# Patient Record
Sex: FEMALE | Race: WHITE | HISPANIC OR LATINO | Employment: FULL TIME | URBAN - METROPOLITAN AREA
[De-identification: names, ages, dates, MRNs, and addresses within clinical notes are randomized per-mention and may not be internally consistent; named-entity substitution may affect disease eponyms.]

---

## 2017-03-08 ENCOUNTER — OFFICE VISIT (OUTPATIENT)
Dept: FAMILY MEDICINE CLINIC | Facility: CLINIC | Age: 27
End: 2017-03-08

## 2017-03-08 VITALS
WEIGHT: 212 LBS | DIASTOLIC BLOOD PRESSURE: 68 MMHG | BODY MASS INDEX: 40.02 KG/M2 | HEIGHT: 61 IN | OXYGEN SATURATION: 96 % | SYSTOLIC BLOOD PRESSURE: 110 MMHG | TEMPERATURE: 97.9 F | HEART RATE: 69 BPM

## 2017-03-08 DIAGNOSIS — E66.01 MORBID OBESITY DUE TO EXCESS CALORIES (HCC): ICD-10-CM

## 2017-03-08 DIAGNOSIS — F17.200 TOBACCO DEPENDENCE: ICD-10-CM

## 2017-03-08 DIAGNOSIS — L72.3 SEBACEOUS CYST OF LEFT AXILLA: ICD-10-CM

## 2017-03-08 DIAGNOSIS — Z00.00 HEALTHCARE MAINTENANCE: Primary | ICD-10-CM

## 2017-03-08 LAB
25(OH)D3 SERPL-MCNC: 8.1 NG/ML
ALBUMIN SERPL-MCNC: 4.4 G/DL (ref 3.2–4.8)
ALBUMIN/GLOB SERPL: 1.4 G/DL (ref 1.5–2.5)
ALP SERPL-CCNC: 112 U/L (ref 25–100)
ALT SERPL W P-5'-P-CCNC: 31 U/L (ref 7–40)
ANION GAP SERPL CALCULATED.3IONS-SCNC: 6 MMOL/L (ref 3–11)
ARTICHOKE IGE QN: 110 MG/DL (ref 0–130)
AST SERPL-CCNC: 24 U/L (ref 0–33)
BASOPHILS # BLD AUTO: 0.01 10*3/MM3 (ref 0–0.2)
BASOPHILS NFR BLD AUTO: 0.1 % (ref 0–1)
BILIRUB BLD-MCNC: NEGATIVE MG/DL
BILIRUB SERPL-MCNC: 0.6 MG/DL (ref 0.3–1.2)
BUN BLD-MCNC: 14 MG/DL (ref 9–23)
BUN/CREAT SERPL: 17.5 (ref 7–25)
CALCIUM SPEC-SCNC: 9.6 MG/DL (ref 8.7–10.4)
CHLORIDE SERPL-SCNC: 103 MMOL/L (ref 99–109)
CHOLEST SERPL-MCNC: 187 MG/DL (ref 0–200)
CLARITY, POC: CLEAR
CO2 SERPL-SCNC: 27 MMOL/L (ref 20–31)
COLOR UR: YELLOW
CREAT BLD-MCNC: 0.8 MG/DL (ref 0.6–1.3)
CRP SERPL-MCNC: 9.1 MG/DL (ref 0–10)
DEPRECATED RDW RBC AUTO: 43.9 FL (ref 37–54)
EOSINOPHIL # BLD AUTO: 0.05 10*3/MM3 (ref 0.1–0.3)
EOSINOPHIL NFR BLD AUTO: 0.7 % (ref 0–3)
ERYTHROCYTE [DISTWIDTH] IN BLOOD BY AUTOMATED COUNT: 13.1 % (ref 11.3–14.5)
GFR SERPL CREATININE-BSD FRML MDRD: 87 ML/MIN/1.73
GLOBULIN UR ELPH-MCNC: 3.2 GM/DL
GLUCOSE BLD-MCNC: 87 MG/DL (ref 70–100)
GLUCOSE UR STRIP-MCNC: NEGATIVE MG/DL
HBA1C MFR BLD: 5.1 % (ref 4.8–5.6)
HCT VFR BLD AUTO: 45.3 % (ref 34.5–44)
HDLC SERPL-MCNC: 69 MG/DL (ref 40–60)
HGB BLD-MCNC: 15.2 G/DL (ref 11.5–15.5)
HIV1+2 AB SER QL: NORMAL
IMM GRANULOCYTES # BLD: 0.01 10*3/MM3 (ref 0–0.03)
IMM GRANULOCYTES NFR BLD: 0.1 % (ref 0–0.6)
KETONES UR QL: NEGATIVE
LEUKOCYTE EST, POC: NEGATIVE
LYMPHOCYTES # BLD AUTO: 2.24 10*3/MM3 (ref 0.6–4.8)
LYMPHOCYTES NFR BLD AUTO: 32.6 % (ref 24–44)
MCH RBC QN AUTO: 30.8 PG (ref 27–31)
MCHC RBC AUTO-ENTMCNC: 33.6 G/DL (ref 32–36)
MCV RBC AUTO: 91.7 FL (ref 80–99)
MONOCYTES # BLD AUTO: 0.33 10*3/MM3 (ref 0–1)
MONOCYTES NFR BLD AUTO: 4.8 % (ref 0–12)
NEUTROPHILS # BLD AUTO: 4.24 10*3/MM3 (ref 1.5–8.3)
NEUTROPHILS NFR BLD AUTO: 61.7 % (ref 41–71)
NITRITE UR-MCNC: NEGATIVE MG/ML
PH UR: 7 [PH] (ref 5–8)
PLATELET # BLD AUTO: 255 10*3/MM3 (ref 150–450)
PMV BLD AUTO: 10.4 FL (ref 6–12)
POTASSIUM BLD-SCNC: 4.2 MMOL/L (ref 3.5–5.5)
PROT SERPL-MCNC: 7.6 G/DL (ref 5.7–8.2)
PROT UR STRIP-MCNC: NEGATIVE MG/DL
RBC # BLD AUTO: 4.94 10*6/MM3 (ref 3.89–5.14)
RBC # UR STRIP: NEGATIVE /UL
SODIUM BLD-SCNC: 136 MMOL/L (ref 132–146)
SP GR UR: 1.02 (ref 1–1.03)
TRIGL SERPL-MCNC: 90 MG/DL (ref 0–150)
TSH SERPL DL<=0.05 MIU/L-ACNC: 1.8 MIU/ML (ref 0.35–5.35)
URATE SERPL-MCNC: 4.6 MG/DL (ref 3.1–7.8)
UROBILINOGEN UR QL: NORMAL
WBC NRBC COR # BLD: 6.88 10*3/MM3 (ref 3.5–10.8)

## 2017-03-08 PROCEDURE — 99385 PREV VISIT NEW AGE 18-39: CPT | Performed by: FAMILY MEDICINE

## 2017-03-08 PROCEDURE — 84443 ASSAY THYROID STIM HORMONE: CPT | Performed by: FAMILY MEDICINE

## 2017-03-08 PROCEDURE — 85025 COMPLETE CBC W/AUTO DIFF WBC: CPT | Performed by: FAMILY MEDICINE

## 2017-03-08 PROCEDURE — 82306 VITAMIN D 25 HYDROXY: CPT | Performed by: FAMILY MEDICINE

## 2017-03-08 PROCEDURE — 99406 BEHAV CHNG SMOKING 3-10 MIN: CPT | Performed by: FAMILY MEDICINE

## 2017-03-08 PROCEDURE — 36415 COLL VENOUS BLD VENIPUNCTURE: CPT | Performed by: FAMILY MEDICINE

## 2017-03-08 PROCEDURE — 81003 URINALYSIS AUTO W/O SCOPE: CPT | Performed by: FAMILY MEDICINE

## 2017-03-08 PROCEDURE — 84550 ASSAY OF BLOOD/URIC ACID: CPT | Performed by: FAMILY MEDICINE

## 2017-03-08 PROCEDURE — 86140 C-REACTIVE PROTEIN: CPT | Performed by: FAMILY MEDICINE

## 2017-03-08 PROCEDURE — G0432 EIA HIV-1/HIV-2 SCREEN: HCPCS | Performed by: FAMILY MEDICINE

## 2017-03-08 PROCEDURE — 83036 HEMOGLOBIN GLYCOSYLATED A1C: CPT | Performed by: FAMILY MEDICINE

## 2017-03-08 PROCEDURE — 80061 LIPID PANEL: CPT | Performed by: FAMILY MEDICINE

## 2017-03-08 PROCEDURE — 80053 COMPREHEN METABOLIC PANEL: CPT | Performed by: FAMILY MEDICINE

## 2017-03-08 RX ORDER — NICOTINE 21 MG/24HR
1 PATCH, TRANSDERMAL 24 HOURS TRANSDERMAL EVERY 24 HOURS
Start: 2017-03-08 | End: 2017-04-25

## 2017-03-08 RX ORDER — CEPHALEXIN 500 MG/1
500 CAPSULE ORAL 4 TIMES DAILY
Qty: 40 CAPSULE | Refills: 0 | Status: SHIPPED | OUTPATIENT
Start: 2017-03-08 | End: 2017-04-25

## 2017-03-08 NOTE — PROGRESS NOTES
Subjective   Martha Diaz is a 26 y.o. female.     History of Present Illness   New patient to the office.  She reports previous care with  Family Medicine.  She also reports care with a  Ob/Gyne.  She is here for her annual fasting wellness evaluation.  She is concerned with an infected left axillary sebaceous cyst.  Her insurance also requires a referral to see her gynecologist.  She is current on her immunizations including her annual flu shot.    Review of Systems   Constitutional: Negative.    HENT: Negative.    Eyes: Negative.    Respiratory: Positive for cough (occasional cough attributed to chronic tobacco use).    Cardiovascular: Negative.    Gastrointestinal: Negative.    Endocrine: Negative.    Genitourinary: Negative.    Musculoskeletal: Negative.    Skin: Negative.         inflamed left axillary sebaceous cyst   Allergic/Immunologic: Negative.    Neurological: Negative.    Hematological: Negative.    Psychiatric/Behavioral: Negative.      Objective   Physical Exam   Constitutional: She is oriented to person, place, and time. She appears well-developed and well-nourished. She is cooperative.   HENT:   Head: Normocephalic and atraumatic.   Right Ear: Hearing and external ear normal.   Left Ear: Hearing and external ear normal.   Nose: Nose normal.   Mouth/Throat: Uvula is midline, oropharynx is clear and moist and mucous membranes are normal.   Eyes: Conjunctivae and EOM are normal. Pupils are equal, round, and reactive to light. No scleral icterus.   Neck: Trachea normal and normal range of motion. Neck supple. No JVD present. Carotid bruit is not present. No thyromegaly present.   Cardiovascular: Normal rate, regular rhythm and intact distal pulses.    Murmur heard.   Systolic murmur is present with a grade of 3/6   Pulmonary/Chest: Effort normal and breath sounds normal.   Abdominal: Soft. Bowel sounds are normal. There is no hepatosplenomegaly. There is no tenderness.   Musculoskeletal: Normal  range of motion.   Lymphadenopathy:     She has no cervical adenopathy.   Neurological: She is alert and oriented to person, place, and time. She has normal strength and normal reflexes. No sensory deficit. Gait normal.   Skin: Skin is warm and dry.   Psychiatric: She has a normal mood and affect. Her speech is normal and behavior is normal. Judgment and thought content normal. Cognition and memory are normal.   Nursing note and vitals reviewed.    Previous physician records not available for review today.    Assessment/Plan   Diagnoses and all orders for this visit:    Healthcare maintenance  -     POC Urinalysis Dipstick, Automated  -     Comprehensive Metabolic Panel  -     CBC & Differential  -     Lipid Panel  -     TSH  -     Uric Acid  -     C-reactive Protein  -     HIV-1 / O / 2 Ag / Antibody 4th Generation  -     Hemoglobin A1c  -     Vitamin D 25 Hydroxy  -     Ambulatory Referral to Gynecology    Morbid obesity   -     Ambulatory Referral to Nutrition Services  -     Ambulatory Referral to Bariatric Surgery  - We discussed aerobic activity and apps for tracking calories    Tobacco dependence  -     Ambulatory Referral to Smoking Cessation Program  -     nicotine (EQ NICOTINE) 21 MG/24HR patch; Place 1 patch on the skin Daily.  - Joseph Zeng Brochure    Sebaceous cyst of left axilla  -     CBC & Differential  -     cephalexin (KEFLEX) 500 MG capsule; Take 1 capsule by mouth 4 (Four) Times a Day.  - Warm compresses and topical care  - See dermatologist if not improved.    The patient is here for a health maintenance visit.  Currently, the patient consumes a calorie enriched diet and has an inadequate exercise regimen. Screening lab work is ordered.  Immunizations are reported as current.  Advice and education is given regarding nutrition, aerobic exercise, routine dental evaluations, routine eye exams, reproductive health, cardiovascular risk reduction, sunscreen use, self skin examination (annual  dermatology evaluations) and seat belt use (general overall safety).  Tobacco cessation counseling lasting greater than 3 minutes but no more than 10 minutes is provided today.  We discussed various smoking cessation programs including Joseph Zeng, Commit to Quit and Kick It.  We discussed over the counter nicotine replacement products.  We discussed the benefits of cessation.  We discussed the risks of ongoing use including but not limited to lung disease, cancer and death.  The patient is encouraged to discontinue smoking and treatment options have been discussed & offered.  Further recommendations after lab evaluation.  Annual wellness evaluations recommended.

## 2017-03-29 ENCOUNTER — APPOINTMENT (OUTPATIENT)
Dept: NUTRITION | Facility: HOSPITAL | Age: 27
End: 2017-03-29
Attending: FAMILY MEDICINE

## 2017-04-25 ENCOUNTER — OFFICE VISIT (OUTPATIENT)
Dept: FAMILY MEDICINE CLINIC | Facility: CLINIC | Age: 27
End: 2017-04-25

## 2017-04-25 VITALS
WEIGHT: 217 LBS | HEIGHT: 61 IN | SYSTOLIC BLOOD PRESSURE: 138 MMHG | RESPIRATION RATE: 16 BRPM | BODY MASS INDEX: 40.97 KG/M2 | HEART RATE: 82 BPM | OXYGEN SATURATION: 99 % | DIASTOLIC BLOOD PRESSURE: 86 MMHG

## 2017-04-25 DIAGNOSIS — N62 BREAST HYPERTROPHY IN FEMALE: Primary | ICD-10-CM

## 2017-04-25 PROCEDURE — 99213 OFFICE O/P EST LOW 20 MIN: CPT | Performed by: FAMILY MEDICINE

## 2017-04-25 RX ORDER — AMITRIPTYLINE HYDROCHLORIDE 25 MG/1
25 TABLET, FILM COATED ORAL AS NEEDED
COMMUNITY

## 2017-04-25 RX ORDER — ALBUTEROL SULFATE 90 UG/1
2 AEROSOL, METERED RESPIRATORY (INHALATION) AS NEEDED
COMMUNITY
End: 2017-10-26 | Stop reason: SDUPTHER

## 2017-04-25 NOTE — PROGRESS NOTES
Subjective   Martha Diaz is a 26 y.o. female.     History of Present Illness   She was recently evaluated at [facility] for chest pain.  She was dismissed with recommendations to lose weight, exercise daily and monitor her blood pressure.  She is concerned with her breast hypertrophy causing her chest pain.  She is inquiring about a referral to a specialist.    Review of Systems   Musculoskeletal: Positive for myalgias and neck pain.       Objective   Physical Exam   Constitutional: She is oriented to person, place, and time. She appears well-developed and well-nourished.   HENT:   Head: Normocephalic and atraumatic.   Eyes: Conjunctivae are normal.   Neck: Neck supple.   Cardiovascular: Normal rate and regular rhythm.    Murmur heard.  Pulmonary/Chest: Effort normal and breath sounds normal.   Musculoskeletal: Normal range of motion.   Neurological: She is alert and oriented to person, place, and time.   Skin: Skin is warm and dry.   Psychiatric: She has a normal mood and affect. Her behavior is normal. Judgment and thought content normal.   Nursing note and vitals reviewed.    [facility] labs reviewed    Assessment/Plan   Diagnoses and all orders for this visit:    Breast hypertrophy in female  -     Ambulatory Referral to Plastic Surgery  - Patient education via Internet  - Supportive clothing  - Posture and upper back mechanics  - Tobacco cessation education  - She will routinely monitor her blood pressures  - Healthy heart diet  - Daily aerobic exercise  - [place] Heart Disease and Stroke Prevention Task Force pamphlet reviewed and administered.

## 2017-04-26 DIAGNOSIS — N62 BREAST HYPERTROPHY IN FEMALE: Primary | ICD-10-CM

## 2017-10-26 ENCOUNTER — TELEPHONE (OUTPATIENT)
Dept: FAMILY MEDICINE CLINIC | Facility: CLINIC | Age: 27
End: 2017-10-26

## 2017-10-26 ENCOUNTER — OFFICE VISIT (OUTPATIENT)
Dept: FAMILY MEDICINE CLINIC | Facility: CLINIC | Age: 27
End: 2017-10-26

## 2017-10-26 VITALS
HEART RATE: 75 BPM | WEIGHT: 232 LBS | OXYGEN SATURATION: 99 % | SYSTOLIC BLOOD PRESSURE: 140 MMHG | DIASTOLIC BLOOD PRESSURE: 88 MMHG | TEMPERATURE: 98.6 F | BODY MASS INDEX: 43.8 KG/M2 | HEIGHT: 61 IN

## 2017-10-26 DIAGNOSIS — Z30.432 AWAITING REMOVAL OF CONTRACEPTIVE INTRAUTERINE DEVICE (IUD): ICD-10-CM

## 2017-10-26 DIAGNOSIS — Z76.0 MEDICATION REFILL: Primary | ICD-10-CM

## 2017-10-26 DIAGNOSIS — M79.645 THUMB PAIN, LEFT: ICD-10-CM

## 2017-10-26 DIAGNOSIS — R12 HEARTBURN: ICD-10-CM

## 2017-10-26 PROCEDURE — 99213 OFFICE O/P EST LOW 20 MIN: CPT | Performed by: NURSE PRACTITIONER

## 2017-10-26 RX ORDER — RANITIDINE 150 MG/1
150 CAPSULE ORAL 2 TIMES DAILY
Qty: 60 CAPSULE | Refills: 2 | Status: SHIPPED | OUTPATIENT
Start: 2017-10-26 | End: 2017-11-25

## 2017-10-26 RX ORDER — ALBUTEROL SULFATE 90 UG/1
2 AEROSOL, METERED RESPIRATORY (INHALATION) EVERY 4 HOURS PRN
Qty: 1 INHALER | Refills: 1 | Status: SHIPPED | OUTPATIENT
Start: 2017-10-26 | End: 2017-11-25

## 2017-10-26 RX ORDER — IBUPROFEN 800 MG/1
800 TABLET ORAL EVERY 6 HOURS PRN
Qty: 60 TABLET | Refills: 0 | Status: SHIPPED | OUTPATIENT
Start: 2017-10-26 | End: 2017-11-10

## 2017-10-26 NOTE — PROGRESS NOTES
Subjective   Martha Diaz is a 27 y.o. female.   Chief Complaint   Patient presents with   • Hand Pain      Hand Pain    The incident occurred 5 to 7 days ago. The incident occurred at home. The injury mechanism was a fall (son fell on left hand at the thumb - ). The pain is present in the left hand. Quality: pressure - aching -  The pain is at a severity of 9/10. The pain is severe. The pain has been constant since the incident. Pertinent negatives include no chest pain, muscle weakness, numbness or tingling. The symptoms are aggravated by movement, lifting and palpation. She has tried NSAIDs, ice and heat (icy hot, tiger balm) for the symptoms. The treatment provided mild relief.       He and her son were playing in the floor and her Son ran and fell on her left hand / thumb. It has been swollen, sore, and painful. She has been wearing a brace on her hand that includes support for the thumb.   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   She reports she has not missed any work.       The following portions of the patient's history were reviewed and updated as appropriate: allergies, current medications, past family history, past medical history, past social history, past surgical history and problem list.    Review of Systems   Constitutional: Positive for activity change. Negative for chills, fatigue and fever.   HENT: Negative.    Eyes: Negative.    Respiratory: Negative.    Cardiovascular: Negative.  Negative for chest pain.   Gastrointestinal: Negative for nausea and vomiting.        Having periods of heart burn. Worsening over the past few months. Have been taking TUMS occasionally.      Musculoskeletal: Positive for myalgias.        Left thumb and the part of the palm is swollen, and painful. Was playing in the floor with my son on Saturday and he accidentally stepped on my hand. I have been using, ice on it and taking advil and then naproxyn sometimes. I just hurt and it doesn't seem like it  "is getting any better. Been wearing a brace on it and or an ace wrap.        Neurological: Negative for tingling and numbness.        No numbness or tingling in the left hand or fingers.          Objective   No Known Allergies  Visit Vitals   • /88 (BP Location: Left arm, Patient Position: Sitting, Cuff Size: Adult)   • Pulse 75   • Temp 98.6 °F (37 °C) (Oral)   • Ht 61\" (154.9 cm)   • Wt 232 lb (105 kg)   • SpO2 99%   • BMI 43.84 kg/m2       Physical Exam   Constitutional: She is oriented to person, place, and time. She appears well-developed and well-nourished.   Cardiovascular: Normal rate and regular rhythm.    Murmur heard.  Pulmonary/Chest: Effort normal and breath sounds normal.   Musculoskeletal:        Arms:  Left thumb area is swollen, tender to touch. Fingers and thumb - nail beds are pink, warm, movable.      Neurological: She is alert and oriented to person, place, and time.   Vitals reviewed.    She would like a referral to GYN to have her IUD removed. Reports she had a referral to see someone at  and due to insurance needs to change to [facility].   She also reports she is having heart burn more frequently over the past several months. She has been taking TUMS with mild relief. She is requesting prescription for something stronger.     Assessment/Plan   Martha was seen today for hand pain.    Diagnoses and all orders for this visit:    Medication refill  -     albuterol (PROVENTIL HFA;VENTOLIN HFA) 108 (90 Base) MCG/ACT inhaler; Inhale 2 puffs Every 4 (Four) Hours As Needed for Wheezing or Shortness of Air for up to 30 days.    Heartburn  -     ranitidine (ZANTAC) 150 MG capsule; Take 1 capsule by mouth 2 (Two) Times a Day for 30 days.    Thumb pain, left  -     XR Hand 3+ View Left; Future  -     ibuprofen (ADVIL,MOTRIN) 800 MG tablet; Take 1 tablet by mouth Every 6 (Six) Hours As Needed for Mild Pain  for up to 15 days.    Awaiting removal of contraceptive intrauterine device " (IUD)  -     Ambulatory Referral to Gynecology      See patient instructions.  Follow up as needed with Dr. Mansfield.       Molly Martinez, APRN

## 2017-10-26 NOTE — PATIENT INSTRUCTIONS
Muscle Pain, Adult  Muscle pain (myalgia) may be caused by many things, including:  · Overuse or muscle strain, especially if you are not in shape. This is the most common cause of muscle pain.  · Injury.  · Bruises.  · Viruses, such as the flu.  · Infectious diseases.  · Fibromyalgia, which is a chronic condition that causes muscle tenderness, fatigue, and headache.  · Autoimmune diseases, including lupus.  · Certain drugs, including ACE inhibitors and statins.  Muscle pain may be mild or severe. In most cases, the pain lasts only a short time and goes away without treatment. To diagnose the cause of your muscle pain, your health care provider will take your medical history. This means he or she will ask you when your muscle pain began and what has been happening. If you have not had muscle pain for very long, your health care provider may want to wait before doing much testing. If your muscle pain has lasted a long time, your health care provider may want to run tests right away. If your health care provider thinks your muscle pain may be caused by illness, you may need to have additional tests to rule out certain conditions.   Treatment for muscle pain depends on the cause. Home care is often enough to relieve muscle pain. Your health care provider may also prescribe anti-inflammatory medicine.  HOME CARE INSTRUCTIONS  Watch your condition for any changes. The following actions may help to lessen any discomfort you are feeling:  · Only take over-the-counter or prescription medicines as directed by your health care provider.  · Apply ice to the sore muscle:    Put ice in a plastic bag.    Place a towel between your skin and the bag.    Leave the ice on for 15-20 minutes, 3-4 times a day.  · You may alternate applying hot and cold packs to the muscle as directed by your health care provider.  · If overuse is causing your muscle pain, slow down your activities until the pain goes away.    Remember that it is normal  to feel some muscle pain after starting a workout program. Muscles that have not been used often will be sore at first.    Do regular, gentle exercises if you are not usually active.    Warm up before exercising to lower your risk of muscle pain.  · Do not continue working out if the pain is very bad. Bad pain could mean you have injured a muscle.  SEEK MEDICAL CARE IF:  · Your muscle pain gets worse, and medicines do not help.  · You have muscle pain that lasts longer than 3 days.  · You have a rash or fever along with muscle pain.  · You have muscle pain after a tick bite.  · You have muscle pain while working out, even though you are in good physical condition.  · You have redness, soreness, or swelling along with muscle pain.  · You have muscle pain after starting a new medicine or changing the dose of a medicine.  SEEK IMMEDIATE MEDICAL CARE IF:  · You have trouble breathing.  · You have trouble swallowing.  · You have muscle pain along with a stiff neck, fever, and vomiting.  · You have severe muscle weakness or cannot move part of your body.  MAKE SURE YOU:   · Understand these instructions.  · Will watch your condition.  · Will get help right away if you are not doing well or get worse.     This information is not intended to replace advice given to you by your health care provider. Make sure you discuss any questions you have with your health care provider.     Document Released: 11/09/2007 Document Revised: 04/10/2017 Document Reviewed: 10/14/2014  [employer] Interactive Patient Education ©2017 [employer]  Heartburn  Heartburn is a type of pain or discomfort that can happen in the throat or chest. It is often described as a burning pain. It may also cause a bad taste in the mouth. Heartburn may feel worse when you lie down or bend over, and it is often worse at night. Heartburn may be caused by stomach contents that move back up into the esophagus (reflux).  HOME CARE INSTRUCTIONS  Take these actions to  decrease your discomfort and to help avoid complications.  Diet  · Follow a diet as recommended by your health care provider. This may involve avoiding foods and drinks such as:    Coffee and tea (with or without caffeine).    Drinks that contain alcohol.    Energy drinks and sports drinks.    Carbonated drinks or sodas.    Chocolate and cocoa.    Peppermint and mint flavorings.    Garlic and onions.    Horseradish.    Spicy and acidic foods, including peppers, chili powder, flores powder, vinegar, hot sauces, and barbecue sauce.    Citrus fruit juices and citrus fruits, such as oranges, halle, and limes.    Tomato-based foods, such as red sauce, chili, salsa, and pizza with red sauce.    Fried and fatty foods, such as donuts, french fries, potato chips, and high-fat dressings.    High-fat meats, such as hot dogs and fatty cuts of red and white meats, such as rib eye steak, sausage, ham, and menjivar.    High-fat dairy items, such as whole milk, butter, and cream cheese.  · Eat small, frequent meals instead of large meals.  · Avoid drinking large amounts of liquid with your meals.  · Avoid eating meals during the 2-3 hours before bedtime.  · Avoid lying down right after you eat.  · Do not exercise right after you eat.   General Instructions   · Pay attention to any changes in your symptoms.  · Take over-the-counter and prescription medicines only as told by your health care provider. Do not take aspirin, ibuprofen, or other NSAIDs unless your health care provider told you to do so.  · Do not use any tobacco products, including cigarettes, chewing tobacco, and e-cigarettes. If you need help quitting, ask your health care provider.  · Wear loose-fitting clothing. Do not wear anything tight around your waist that causes pressure on your abdomen.  · Raise (elevate) the head of your bed about 6 inches (15 cm).  · Try to reduce your stress, such as with yoga or meditation. If you need help reducing stress, ask your health  care provider.  · If you are overweight, reduce your weight to an amount that is healthy for you. Ask your health care provider for guidance about a safe weight loss goal.  · Keep all follow-up visits as told by your health care provider. This is important.  SEEK MEDICAL CARE IF:  · You have new symptoms.  · You have unexplained weight loss.  · You have difficulty swallowing, or it hurts to swallow.  · You have wheezing or a persistent cough.  · Your symptoms do not improve with treatment.  · You have frequent heartburn for more than two weeks.  SEEK IMMEDIATE MEDICAL CARE IF:  · You have pain in your arms, neck, jaw, teeth, or back.  · You feel sweaty, dizzy, or light-headed.  · You have chest pain or shortness of breath.  · You vomit and your vomit looks like blood or coffee grounds.  · Your stool is bloody or black.     This information is not intended to replace advice given to you by your health care provider. Make sure you discuss any questions you have with your health care provider.     Document Released: 05/06/2010 Document Revised: 04/10/2017 Document Reviewed: 04/13/2016  [employer] Interactive Patient Education ©2017 [employer]  Ranitidine tablets or capsules  What is this medicine?  RANITIDINE (indra rucker) is a type of antihistamine that blocks the release of stomach acid. It is used to treat stomach or intestinal ulcers. It can relieve ulcer pain and discomfort, and the heartburn from acid reflux.  This medicine may be used for other purposes; ask your health care provider or pharmacist if you have questions.  COMMON BRAND NAME(S): Acid Reducer, Ranitidine, Taladine, Wal-Artemio, Zantac, Zantac 150, Zantac 75  What should I tell my health care provider before I take this medicine?  They need to know if you have any of these conditions:  -kidney disease  -liver disease  -porphyria  -an unusual or allergic reaction to ranitidine, other medicines, foods, dyes, or preservatives  -pregnant or trying to get  pregnant  -breast-feeding  How should I use this medicine?  Take this medicine by mouth with a glass of water. Follow the directions on the prescription label. If you only take this medicine once a day, take it at bedtime. Take your medicine at regular intervals. Do not take your medicine more often than directed. Do not stop taking except on your doctor's advice.  Talk to your pediatrician regarding the use of this medicine in children. Special care may be needed.  Overdosage: If you think you have taken too much of this medicine contact a poison control center or emergency room at once.  NOTE: This medicine is only for you. Do not share this medicine with others.  What if I miss a dose?  If you miss a dose, take it as soon as you can. If it is almost time for your next dose, take only that dose. Do not take double or extra doses.  What may interact with this medicine?  -atazanavir  -delavirdine  -gefitinib  -glipizide  -ketoconazole  -midazolam  -procainamide  -propantheline  -triazolam  -warfarin  This list may not describe all possible interactions. Give your health care provider a list of all the medicines, herbs, non-prescription drugs, or dietary supplements you use. Also tell them if you smoke, drink alcohol, or use illegal drugs. Some items may interact with your medicine.  What should I watch for while using this medicine?  Tell your doctor or health care professional if your condition does not start to get better or gets worse. You may need to take this medicine for several days as prescribed before your symptoms get better. Finish the full course of tablets prescribed, even if you feel better.  Do not smoke cigarettes or drink alcohol. These increase irritation in your stomach and can lengthen the time it will take for ulcers to heal. Cigarettes and alcohol can also make acid reflux or heartburn worse.  If you get black, tarry stools or vomit up what looks like coffee grounds, call your doctor or health  care professional at once. You may have a bleeding ulcer.  What side effects may I notice from receiving this medicine?  Side effects that you should report to your doctor or health care professional as soon as possible:  -agitation, nervousness, depression, hallucinations  -allergic reactions like skin rash, itching or hives, swelling of the face, lips, or tongue  -breast enlargement in both males and females  -breathing problems  -redness, blistering, peeling or loosening of the skin, including inside the mouth  -unusual bleeding or bruising  -unusually weak or tired  -vomiting  -yellowing of the skin or eyes  Side effects that usually do not require medical attention (report to your doctor or health care professional if they continue or are bothersome):  -constipation or diarrhea  -dizziness  -headache  -nausea  This list may not describe all possible side effects. Call your doctor for medical advice about side effects. You may report side effects to FDA at 5-051-FDA-7890.  Where should I keep my medicine?  Keep out of the reach of children.  Store at room temperature between 15 and 30 degrees C (59 and 86 degrees F). Protect from light and moisture. Keep container tightly closed. Throw away any unused medicine after the expiration date.  NOTE: This sheet is a summary. It may not cover all possible information. If you have questions about this medicine, talk to your doctor, pharmacist, or health care provider.     © 2017, [employer]. (2014-04-09 14:50:34)

## 2017-11-01 ENCOUNTER — HOSPITAL ENCOUNTER (EMERGENCY)
Facility: HOSPITAL | Age: 27
Discharge: LEFT BEFORE DISCHARGE | End: 2017-11-01

## 2017-11-01 VITALS
BODY MASS INDEX: 43.8 KG/M2 | HEART RATE: 85 BPM | DIASTOLIC BLOOD PRESSURE: 94 MMHG | WEIGHT: 232 LBS | TEMPERATURE: 98.2 F | SYSTOLIC BLOOD PRESSURE: 159 MMHG | OXYGEN SATURATION: 97 % | HEIGHT: 61 IN | RESPIRATION RATE: 18 BRPM

## 2017-11-01 PROCEDURE — 93005 ELECTROCARDIOGRAM TRACING: CPT

## 2017-11-01 PROCEDURE — 99211 OFF/OP EST MAY X REQ PHY/QHP: CPT

## 2017-11-15 ENCOUNTER — OFFICE VISIT (OUTPATIENT)
Dept: OBSTETRICS AND GYNECOLOGY | Facility: CLINIC | Age: 27
End: 2017-11-15

## 2017-11-15 VITALS
HEIGHT: 61 IN | RESPIRATION RATE: 14 BRPM | HEART RATE: 103 BPM | SYSTOLIC BLOOD PRESSURE: 138 MMHG | BODY MASS INDEX: 42.9 KG/M2 | DIASTOLIC BLOOD PRESSURE: 100 MMHG | OXYGEN SATURATION: 97 % | WEIGHT: 227.2 LBS

## 2017-11-15 DIAGNOSIS — Z30.432 ENCOUNTER FOR REMOVAL OF INTRAUTERINE CONTRACEPTIVE DEVICE (IUD): ICD-10-CM

## 2017-11-15 DIAGNOSIS — Z01.419 WELL WOMAN EXAM WITH ROUTINE GYNECOLOGICAL EXAM: Primary | ICD-10-CM

## 2017-11-15 PROCEDURE — 99385 PREV VISIT NEW AGE 18-39: CPT | Performed by: NURSE PRACTITIONER

## 2017-11-15 PROCEDURE — 58301 REMOVE INTRAUTERINE DEVICE: CPT | Performed by: NURSE PRACTITIONER

## 2017-11-15 RX ORDER — LORAZEPAM 1 MG/1
1 TABLET ORAL EVERY 8 HOURS PRN
COMMUNITY
End: 2018-05-22 | Stop reason: ALTCHOICE

## 2017-11-15 NOTE — PROGRESS NOTES
[facility] NEW PATIENT ANNUAL WELL WOMAN VISIT      Martha Diaz  [id number]  1990      Chief Complaint: Establish Care (No complaints)        History of present illness:    Martha Diaz is a 27 y.o. year old , new to our office, presenting to be seen for her annual exam. She has had a Mirena IUD since 2012. She is due to have this removed and would like to change to Nexplanon. She is overdue for well woman care and a pap smear.     SEXUAL Hx:  She is currently sexually active.  In the past year there has not been new sexual partners.    Condoms are not typically used.  She would not like to be screened for STD's at today's exam.  Current birth control method: IUD - Mirena,  in 2017  She is happy with her current method of contraception and does not want to discuss alternative methods of contraception.  MENSTRUAL Hx:  No LMP recorded (lmp unknown). Patient has had an implant.  In the past 6 months her cycles have been absent.   Her menstrual flow has been absent.   Each month on average there are roughly 0 days of very heavy flow.    Intermenstrual bleeding is absent.    Post-coital bleeding is absent.  Dysmenorrhea: is not affecting her activities of daily living  PMS: is not affecting her activities of daily living  Her cycles are not a source of concern for her that she wishes to discuss today.  HEALTH Hx:  She exercises regularly: no (and has no plans to become more active).  She wears her seat belt:yes.  She has concerns about domestic violence: no.  She is a non-smoker.      Past Medical History:   Diagnosis Date   • Anxiety and depression    • Asthma    • Heart murmur    • Morbid obesity    • Tobacco dependence        Past Surgical History:   Procedure Laterality Date   •  SECTION  2011   •  SECTION PRIMARY  2008   • INTRAUTERINE DEVICE INSERTION  2012   • OVARIAN CYST REMOVAL Right    • WISDOM TOOTH EXTRACTION         MEDICATIONS: The current  "medication list was reviewed and reconciled.     Allergies:  has No Known Allergies.    Family History   Problem Relation Age of Onset   • Diabetes Mother    • Rheum arthritis Mother    • Hypertension Mother    • Hyperlipidemia Mother    • Heart disease Father    • Asthma Father    • Breast cancer Paternal Aunt        Health Maintenance:  Last pap smear was 2014, results were  normal PAP.. She  does not have a history of abnormal pap smears.    Review of Systems   Constitutional: Negative for fatigue, fever and unexpected weight change.   HENT: Negative for congestion, ear pain, hearing loss, sinus pressure and trouble swallowing.    Eyes: Negative for visual disturbance.   Respiratory: Negative for cough, chest tightness, shortness of breath and wheezing.    Cardiovascular: Negative for chest pain, palpitations and leg swelling.   Gastrointestinal: Negative for abdominal distention, abdominal pain, constipation, diarrhea, nausea and vomiting.   Endocrine: Negative for cold intolerance, heat intolerance, polydipsia, polyphagia and polyuria.   Genitourinary: Negative for difficulty urinating, dyspareunia, dysuria, frequency, hematuria, pelvic pain, urgency, vaginal bleeding, vaginal discharge and vaginal pain.   Musculoskeletal: Negative for arthralgias, gait problem, joint swelling and myalgias.   Skin: Negative for color change, pallor and rash.   Neurological: Negative for dizziness, seizures, syncope, weakness, light-headedness, numbness and headaches.   Hematological: Negative for adenopathy. Does not bruise/bleed easily.   Psychiatric/Behavioral: Negative for agitation, confusion, sleep disturbance and suicidal ideas. The patient is not nervous/anxious.        Physical Exam  Vital Signs: /100  Pulse 103  Resp 14  Ht 61\" (154.9 cm)  Wt 227 lb 3.2 oz (103 kg)  LMP  (LMP Unknown)  SpO2 97%  Breastfeeding? No  BMI 42.93 kg/m2   General Appearance:  alert, cooperative, no apparent distress and appears " stated age   Neurologic/Psychiatric: A&O x 3, gait steady, appropriate affect   HEENT:  Normocephalic, without obvious abnormality, mucous membranes moist   Neck: Supple, symmetrical, trachea midline, no adenopathy;  No thyromegaly, masses, or tenderness   Back:   Symmetric, no curvature, ROM normal, no CVA tenderness   Lungs:   Clear to auscultation bilaterally; respirations regular, even, and unlabored bilaterally   Heart:  Regular rate and rhythm, no murmurs appreciated   Breasts:  Symmetrical, no masses, no lesions and no nipple discharge   Abdomen:   Soft, non-tender, non-distended and no organomegaly   Lymph nodes: No cervical, supraclavicular, inguinal or axillary adenopathy noted   Extremities: Normal, atraumatic; no clubbing, cyanosis, or edema    Skin: No rashes, ulcers, or suspicious lesions noted   Pelvic: External Genitalia  without lesions or skin changes  Vagina  is pink, moist, without lesions.   Cervix  normal, without lesions, no discharge, no CMT, pap obtained and IUD removed  Uterus  normal size, midposition, mobile and nontender  Ovaries  without palpable masses or fullness  Parametria  smooth  Rectovaginal  Female rectovaginal: deferred       Procedure Note:  .After discussion of procedure and obtaining consent,  Mirena IUD removed during pelvic exam. Strings were well visualized. Cervical dilation did not need to be performed and a tenaculum was not required. IUD was removed easily and did not appear to be adherent. Patient tolerated procedure with only mild discomfort and there was no bleeding.     Assessment and Plan:    Martha was seen today for establish care.    Diagnoses and all orders for this visit:    Well woman exam with routine gynecological exam  -     Pap IG, Rfx HPV ASCU - ThinPrep Vial, Cervix; Future    Encounter for removal of intrauterine contraceptive device (IUD)        Pap was done today.  If she does not receive the results of the Pap within 2 weeks  time, she was  instructed to call to find out the results.  I explained to Martha that the recommendations for Pap smear interval in a low risk patient's has lengthened to 3 years time.  I encouraged her to be seen yearly for a full physical exam including breast and pelvic exam even during the off years when PAP's will not be performed.    She was recommended to begin mammograms at age 40 for breast cancer screening, colonoscopy at age 50 for colon cancer screening and bone density scans (DEXA) at age 60-65 for osteoporosis screening.    We discussed alternative contraceptive options as she does not desire to continue IUD management. She in interested in Nexplanon. All questions answered to her satisfaction. This will be prior authorized with her insurance and she may return in the next 1-2 week for insertion.       Return for Nexplanon insertion.      DESIREE Mantilla      Note: Speech recognition transcription software was used to dictate portions of this document.  An attempt at proofreading has been made though minor errors in transcription may still be present.  Please do not hesitate to call our office with any questions.

## 2017-12-05 ENCOUNTER — OFFICE VISIT (OUTPATIENT)
Dept: FAMILY MEDICINE CLINIC | Facility: CLINIC | Age: 27
End: 2017-12-05

## 2017-12-05 VITALS
BODY MASS INDEX: 43.01 KG/M2 | HEART RATE: 80 BPM | HEIGHT: 61 IN | DIASTOLIC BLOOD PRESSURE: 82 MMHG | WEIGHT: 227.8 LBS | SYSTOLIC BLOOD PRESSURE: 130 MMHG | OXYGEN SATURATION: 98 %

## 2017-12-05 DIAGNOSIS — M54.2 NECK PAIN, CHRONIC: Primary | ICD-10-CM

## 2017-12-05 DIAGNOSIS — G89.29 NECK PAIN, CHRONIC: Primary | ICD-10-CM

## 2017-12-05 PROCEDURE — 99213 OFFICE O/P EST LOW 20 MIN: CPT | Performed by: FAMILY MEDICINE

## 2017-12-05 NOTE — PROGRESS NOTES
"Tracy Diaz is a 27 y.o. female.     History of Present Illness   She describes chronic neck and shoulder pain attributed to her breast hypertrophy (42 DDD reported).  She was referred to plastic surgery back in April.  She reports her insurance will not cover her care unless she does \"physical therapy first.\"  She is needing a referral to physical therapy for her chronic neck and shoulder pain.  It is characterized as a throbbing, constant ache that starts at her upper back and radiates out to her shoulders and up to her neck.  It is worse after a long day of activities.  She recalls no associated injury or trauma.  She attributes her concerns to her enlarged breasts.  She denies radiculopathy symptoms, loss of  or other neurological changes.  She describes stress in her life.    Review of Systems   Constitutional: Positive for fatigue.   Musculoskeletal: Positive for back pain, myalgias and neck pain.   Skin: Negative for rash.   Neurological: Positive for headaches. Negative for dizziness, syncope and weakness.   Psychiatric/Behavioral: Positive for sleep disturbance. The patient is nervous/anxious.        Objective    Sulaiman Granados in the room assisting today  Physical Exam   Constitutional: She is oriented to person, place, and time. She appears well-developed and well-nourished.   HENT:   Head: Normocephalic and atraumatic.   Right Ear: Hearing normal.   Left Ear: Hearing normal.   Mouth/Throat: Mucous membranes are normal.   Eyes: Conjunctivae and EOM are normal. Pupils are equal, round, and reactive to light.   Neck: Neck supple. No JVD present. Muscular tenderness present. No spinous process tenderness present. No thyromegaly present.   Cardiovascular: Normal rate, regular rhythm and normal heart sounds.    Pulmonary/Chest: Effort normal and breath sounds normal.   Musculoskeletal: Normal range of motion.        Cervical back: She exhibits tenderness and spasm. She exhibits normal " range of motion, no bony tenderness, no swelling, no edema and no deformity.   Grooves indentified   Neurological: She is alert and oriented to person, place, and time. She has normal strength and normal reflexes. No sensory deficit. Gait normal.   Skin: Skin is warm and dry.   Psychiatric: Her speech is normal and behavior is normal. Judgment and thought content normal. Her mood appears anxious. Cognition and memory are normal.   Nursing note and vitals reviewed.    Nutrition note reviewed (non-compliance noted)  Spring 2017 labs reviewed    Assessment/Plan   Diagnoses and all orders for this visit:    Neck pain, chronic  -     Ambulatory Referral to Physical Therapy Evaluate and treat  - Aleve BID with food prn  - Neck range of motion stretches daily  - Upper back strengthening exercises  - Gentle massage prn  - Posture mechanics reviewed  - Avoid overuse  - Follow up with Plastics concerning breast hypertrophy (appropriate support wear encouraged).

## 2017-12-08 ENCOUNTER — OFFICE VISIT (OUTPATIENT)
Dept: OBSTETRICS AND GYNECOLOGY | Facility: CLINIC | Age: 27
End: 2017-12-08

## 2017-12-08 VITALS
RESPIRATION RATE: 16 BRPM | WEIGHT: 228 LBS | SYSTOLIC BLOOD PRESSURE: 130 MMHG | BODY MASS INDEX: 43.08 KG/M2 | DIASTOLIC BLOOD PRESSURE: 80 MMHG

## 2017-12-08 DIAGNOSIS — Z30.017 NEXPLANON INSERTION: Primary | ICD-10-CM

## 2017-12-08 LAB
B-HCG UR QL: NEGATIVE
INTERNAL NEGATIVE CONTROL: NEGATIVE
INTERNAL POSITIVE CONTROL: POSITIVE
Lab: NORMAL

## 2017-12-08 PROCEDURE — 11981 INSERTION DRUG DLVR IMPLANT: CPT | Performed by: NURSE PRACTITIONER

## 2017-12-08 PROCEDURE — 81025 URINE PREGNANCY TEST: CPT | Performed by: NURSE PRACTITIONER

## 2017-12-08 NOTE — PROGRESS NOTES
NEXPLANON INSERTION    Martha Diaz  1990  [id number]    Patient's last menstrual period was 11/16/2017.   UPT negative upon arrival.     Date of procedure:  12/8/2017    Risks and benefits discussed? yes  All questions answered? yes  Consents given by the patient  Written consent obtained? yes    Local anesthesia used:  yes - 5 cc's of  Meds; anesthesia local: 1% lidocaine    Procedure documentation:    The upper left arm (non-dominant) was marked at the intended site of insertion.  Betadine was used to cleanse the skin.  Local anesthesia was injected.  The Nexplanon was placed subdermally without difficulty.  The devise was able to be palpated in the arm by both myself and Martha.  Steri-strips were then placed across the site of insertion and the arm was wrapped.    She tolerated the procedure well.  There were no complications.  EBL was minimal.    Post procedure instructions: Remove the wrapping in 24 hours and the steri-strips in 5 days. Wallet card given and patient is understanding that device will need to be removed or replaced in 3 years.    Follow up needed: Annual exam or PRN    This note was electronically signed.    Elizabeth Cox, APRN  12/08/17

## 2018-05-16 NOTE — TELEPHONE ENCOUNTER
PT RQSTD ENOUGH TO HOLD HER OVER UNTIL 6/6/18, THAT IS WHEN SHE IS SCHEDULED TO SEE HER NEW MENTAL HEALTH PROVIDER.    THANKS,  SOWMYA

## 2018-05-18 RX ORDER — LORAZEPAM 1 MG/1
1 TABLET ORAL EVERY 8 HOURS PRN
OUTPATIENT
Start: 2018-05-18

## 2018-05-21 ENCOUNTER — HOSPITAL ENCOUNTER (EMERGENCY)
Facility: HOSPITAL | Age: 28
Discharge: HOME | End: 2018-05-22
Attending: EMERGENCY MEDICINE | Admitting: EMERGENCY MEDICINE

## 2018-05-21 ENCOUNTER — APPOINTMENT (OUTPATIENT)
Dept: GENERAL RADIOLOGY | Facility: HOSPITAL | Age: 28
End: 2018-05-21

## 2018-05-21 DIAGNOSIS — F41.0 PANIC ATTACK: Primary | ICD-10-CM

## 2018-05-21 LAB
ALBUMIN SERPL-MCNC: 4.4 G/DL (ref 3.2–4.8)
ALBUMIN/GLOB SERPL: 1.3 G/DL (ref 1.5–2.5)
ALP SERPL-CCNC: 112 U/L (ref 25–100)
ALT SERPL W P-5'-P-CCNC: 99 U/L (ref 7–40)
ANION GAP SERPL CALCULATED.3IONS-SCNC: 18 MMOL/L (ref 3–11)
AST SERPL-CCNC: 60 U/L (ref 0–33)
B-HCG UR QL: NEGATIVE
BACTERIA UR QL AUTO: ABNORMAL /HPF
BASOPHILS # BLD AUTO: 0.01 10*3/MM3 (ref 0–0.2)
BASOPHILS NFR BLD AUTO: 0.1 % (ref 0–1)
BILIRUB SERPL-MCNC: 0.8 MG/DL (ref 0.3–1.2)
BILIRUB UR QL STRIP: ABNORMAL
BUN BLD-MCNC: 10 MG/DL (ref 9–23)
BUN/CREAT SERPL: 12.5 (ref 7–25)
CALCIUM SPEC-SCNC: 9.3 MG/DL (ref 8.7–10.4)
CHLORIDE SERPL-SCNC: 99 MMOL/L (ref 99–109)
CLARITY UR: CLEAR
CO2 SERPL-SCNC: 18 MMOL/L (ref 20–31)
COLOR UR: ABNORMAL
CREAT BLD-MCNC: 0.8 MG/DL (ref 0.6–1.3)
DEPRECATED RDW RBC AUTO: 44.4 FL (ref 37–54)
EOSINOPHIL # BLD AUTO: 0 10*3/MM3 (ref 0–0.3)
EOSINOPHIL NFR BLD AUTO: 0 % (ref 0–3)
ERYTHROCYTE [DISTWIDTH] IN BLOOD BY AUTOMATED COUNT: 13.6 % (ref 11.3–14.5)
GFR SERPL CREATININE-BSD FRML MDRD: 87 ML/MIN/1.73
GLOBULIN UR ELPH-MCNC: 3.5 GM/DL
GLUCOSE BLD-MCNC: 96 MG/DL (ref 70–100)
GLUCOSE UR STRIP-MCNC: NEGATIVE MG/DL
HCT VFR BLD AUTO: 42.1 % (ref 34.5–44)
HGB BLD-MCNC: 14.7 G/DL (ref 11.5–15.5)
HGB UR QL STRIP.AUTO: NEGATIVE
HYALINE CASTS UR QL AUTO: ABNORMAL /LPF
IMM GRANULOCYTES # BLD: 0.02 10*3/MM3 (ref 0–0.03)
IMM GRANULOCYTES NFR BLD: 0.2 % (ref 0–0.6)
INTERNAL NEGATIVE CONTROL: NEGATIVE
INTERNAL POSITIVE CONTROL: POSITIVE
KETONES UR QL STRIP: ABNORMAL
LEUKOCYTE ESTERASE UR QL STRIP.AUTO: ABNORMAL
LYMPHOCYTES # BLD AUTO: 0.76 10*3/MM3 (ref 0.6–4.8)
LYMPHOCYTES NFR BLD AUTO: 7.4 % (ref 24–44)
Lab: NORMAL
MCH RBC QN AUTO: 31.4 PG (ref 27–31)
MCHC RBC AUTO-ENTMCNC: 34.9 G/DL (ref 32–36)
MCV RBC AUTO: 90 FL (ref 80–99)
MONOCYTES # BLD AUTO: 0.53 10*3/MM3 (ref 0–1)
MONOCYTES NFR BLD AUTO: 5.2 % (ref 0–12)
NEUTROPHILS # BLD AUTO: 8.93 10*3/MM3 (ref 1.5–8.3)
NEUTROPHILS NFR BLD AUTO: 87.3 % (ref 41–71)
NITRITE UR QL STRIP: NEGATIVE
PH UR STRIP.AUTO: 6 [PH] (ref 5–8)
PLATELET # BLD AUTO: 223 10*3/MM3 (ref 150–450)
PMV BLD AUTO: 10.3 FL (ref 6–12)
POTASSIUM BLD-SCNC: 3.4 MMOL/L (ref 3.5–5.5)
PROT SERPL-MCNC: 7.9 G/DL (ref 5.7–8.2)
PROT UR QL STRIP: ABNORMAL
RBC # BLD AUTO: 4.68 10*6/MM3 (ref 3.89–5.14)
RBC # UR: ABNORMAL /HPF
REF LAB TEST METHOD: ABNORMAL
SODIUM BLD-SCNC: 135 MMOL/L (ref 132–146)
SP GR UR STRIP: >=1.03 (ref 1–1.03)
SQUAMOUS #/AREA URNS HPF: ABNORMAL /HPF
TROPONIN I SERPL-MCNC: 0.01 NG/ML (ref 0–0.07)
UROBILINOGEN UR QL STRIP: ABNORMAL
WBC NRBC COR # BLD: 10.23 10*3/MM3 (ref 3.5–10.8)
WBC UR QL AUTO: ABNORMAL /HPF

## 2018-05-21 PROCEDURE — 93005 ELECTROCARDIOGRAM TRACING: CPT

## 2018-05-21 PROCEDURE — 84484 ASSAY OF TROPONIN QUANT: CPT

## 2018-05-21 PROCEDURE — 80053 COMPREHEN METABOLIC PANEL: CPT | Performed by: EMERGENCY MEDICINE

## 2018-05-21 PROCEDURE — 71045 X-RAY EXAM CHEST 1 VIEW: CPT

## 2018-05-21 PROCEDURE — 93005 ELECTROCARDIOGRAM TRACING: CPT | Performed by: EMERGENCY MEDICINE

## 2018-05-21 PROCEDURE — 99284 EMERGENCY DEPT VISIT MOD MDM: CPT

## 2018-05-21 PROCEDURE — 81001 URINALYSIS AUTO W/SCOPE: CPT | Performed by: EMERGENCY MEDICINE

## 2018-05-21 PROCEDURE — 85025 COMPLETE CBC W/AUTO DIFF WBC: CPT | Performed by: EMERGENCY MEDICINE

## 2018-05-22 VITALS
DIASTOLIC BLOOD PRESSURE: 94 MMHG | HEART RATE: 63 BPM | WEIGHT: 226 LBS | OXYGEN SATURATION: 99 % | TEMPERATURE: 98.8 F | HEIGHT: 61 IN | BODY MASS INDEX: 42.67 KG/M2 | RESPIRATION RATE: 20 BRPM | SYSTOLIC BLOOD PRESSURE: 180 MMHG

## 2018-05-22 VITALS
OXYGEN SATURATION: 100 % | WEIGHT: 232 LBS | HEIGHT: 61 IN | SYSTOLIC BLOOD PRESSURE: 119 MMHG | HEART RATE: 57 BPM | DIASTOLIC BLOOD PRESSURE: 92 MMHG | TEMPERATURE: 98.1 F | RESPIRATION RATE: 28 BRPM | BODY MASS INDEX: 43.8 KG/M2

## 2018-05-22 DIAGNOSIS — F17.200 TOBACCO DEPENDENCE: ICD-10-CM

## 2018-05-22 DIAGNOSIS — F43.9 STRESS AT HOME: ICD-10-CM

## 2018-05-22 DIAGNOSIS — F41.0 PANIC ATTACK: ICD-10-CM

## 2018-05-22 DIAGNOSIS — F41.9 ANXIETY: Primary | ICD-10-CM

## 2018-05-22 PROCEDURE — 99283 EMERGENCY DEPT VISIT LOW MDM: CPT

## 2018-05-22 RX ORDER — HYDROXYZINE HYDROCHLORIDE 25 MG/1
50 TABLET, FILM COATED ORAL ONCE
Status: COMPLETED | OUTPATIENT
Start: 2018-05-22 | End: 2018-05-22

## 2018-05-22 RX ORDER — HYDROXYZINE HYDROCHLORIDE 25 MG/1
25 TABLET, FILM COATED ORAL EVERY 6 HOURS PRN
Qty: 20 TABLET | Refills: 0 | Status: SHIPPED | OUTPATIENT
Start: 2018-05-22

## 2018-05-22 RX ADMIN — HYDROXYZINE HYDROCHLORIDE 50 MG: 25 TABLET, FILM COATED ORAL at 02:31

## 2018-05-22 NOTE — DISCHARGE INSTRUCTIONS
Patient was given Rx for Vistaril 0.5 mg by mouth every 6 hours as needed for anxiety earlier in the ER on this same date.  She is to get this filled and take as directed.  We will give her follow-up information for psychiatric services here in [facility].  Return if worsening symptoms.    [facility]  [address] [place] [address]  [phone]      [facility]    [address]  [address]  [place] [address]  [phone]       [address]  [address]  [place] [address]  [phone]      [facility] ([place] BCBS, [place] HMO, [place] PPO, [place] EPO, [place] Student Ins, Medicare and Medicaid)    [address]  3rd [place] [address]  [phone]       [facility] (4 locations) Accepts most insurance and Medicaid ([place] location only) No Medicare, [place] or [place]    [facility] ([place])  [address] [place] [address]  [phone]       [facility] ([place]  [address]  [address]  [place] [address]  [phone]      [facility]  [address]  [facility] [place] [address]  [phone]                     [facility]  [address] [place] [address] [phone]       [facility] (Preferred Medicaid Provider and some Major Insurance Plans) [phone]    [facility]  [address] [place] [address]       [facility]  [address] [place] [address]       [facility]  [address] [place] [address]       [facility] [employer] [facility] (Most insurance and Medicaid)  [address]  [address]nd [place] [address]  [phone]      [facility]    [address]  [place] [address]  [phone] [address]  [address]  [place] [address]       [facility]  [address] [place] [address] [phone]       [facility] 24-Hour Help Line [phone]      [facility]  [address]  [place] [address]  [phone]             [facility]  [address] [address]  [phone]             [facility] Intellectual and Development Disabilities  [address] [place] [address]  [phone]               [facility]  [address]  [place] [address]  [phone]       [facility]  [address] [place] [address]  [phone]      [facility]  [address] [place] [address]  [phone]              [facility]  [address]  [place] [address]  [phone]                  [facility]  [address] [place], [place] [address] [phone]      [facility]  [address] [place] [address] [phone]                  [facility]  [address] [place] [address] [phone]      [facility]  [address] [place] [address] [phone]       [facility]  [address] [place] [address] [phone]      [facility]  [address] [place] [address] [phone]       [facility]  [address] [place] [address] [phone]

## 2018-05-22 NOTE — DISCHARGE INSTRUCTIONS
Patient is advised to take hydroxyzine as needed for anxiety.     Contact one of the providers below to obtain further outpatient follow-up.       Follow up with one of the [facility] physician groups below to setup primary care. If you have trouble following up, please call Dannielle Alfaro, our transitional care nurse, at [phone].    (Dr. Velasquez, Dr. Ruiz, Dr. Manzanares, and Dr. Montez.)  [facility], Primary Care, [phone], [address], [place] [address]    [facility], Primary Care, [phone], [address] [place], [address] [facility], Primary Care, [phone], [address], [address] [place], [address] [facility] Medical [facility], Primary Care, [phone], [address] [facility] [facility] [facility] [facility] [address], [address] [place], [address]     [place] [address] [facility], Primary Care, [phone], [address], [address] [place], [address]    [place] [address] [facility], Primary Care, [phone], [address]    [place], [address] [facility], Primary Care, [phone], [address], [address] [place], [address] [facility], Primary Care, [phone], [address], [address] [place], [address] [facility]) [facility], Primary Care, [phone], [address] [facility], [address] [place], [address] [facility] Medical [facility], Primary Care, [phone], [address] [facility], [address] [place], [address] [facility] Medical [facility], Primary Care, [phone], [address], [address], [place], [address] [place] [address], Primary Care, [phone], [facility] [facility], [place] [address]

## 2018-05-22 NOTE — ED PROVIDER NOTES
"Subjective   MsPascual Diaz is a 27 y.o. female who presents to the ED with c/o panic attack onset approximately 3 hours ago. Pt reports she was at work and had sudden onset of panic attack sx including tachypnea, chest pain, anxiety, and tingling in LUE. She states at initial onset she was having a pleasant day and not experiencing any overwhelming stress. After sx did not resolve pt tried to drive home, however she became \"too scared\" and anxious which prompted her to call EMS. In ED now, pt complains of chest pain, cough, nausea, insomnia, and abd pain with an associated \"muscle spasm\" sensation. The chest pain is localized to the substernal region and has a \"heaviness sensation\" that is 6/10 in severity.     Additionally, pt states she has been having panic attacks frequently in the past 3 months. She has seen her PCP who suggested the chest pain is secondary to anxiety. She states she feels her PCP thinks she is \"faking\" her sx and states he denied her request to give a referral for further evaluation.     Pt has hx of polycystic ovaries, anxiety, depression, heart murmur, and tobacco dependence. She has no other acute sx at this time.             History provided by:  Patient  Anxiety   Presents for initial visit. Onset was 1 to 6 months ago. The problem has been gradually improving. Symptoms include chest pain, insomnia, nausea, nervous/anxious behavior and shortness of breath. Symptoms occur occasionally. The severity of symptoms is moderate.     Her past medical history is significant for anxiety/panic attacks and depression. Past treatments include nothing.       Review of Systems   Respiratory: Positive for cough and shortness of breath.    Cardiovascular: Positive for chest pain.   Gastrointestinal: Positive for abdominal pain and nausea.   Neurological:        Tingling in LUE   Psychiatric/Behavioral: Positive for sleep disturbance. The patient is nervous/anxious and has insomnia.    All other " systems reviewed and are negative.      Past Medical History:   Diagnosis Date   • Anxiety and depression    • Asthma    • Heart murmur    • Morbid obesity    • Tobacco dependence        No Known Allergies    Past Surgical History:   Procedure Laterality Date   •  SECTION  2011   •  SECTION PRIMARY  2008   • INTRAUTERINE DEVICE INSERTION  2012   • OVARIAN CYST REMOVAL Right 2007   • WISDOM TOOTH EXTRACTION  2013       Family History   Problem Relation Age of Onset   • Diabetes Mother    • Rheum arthritis Mother    • Hypertension Mother    • Hyperlipidemia Mother    • Heart disease Father    • Asthma Father    • Breast cancer Paternal Aunt        Social History     Social History   • Marital status:    • Number of children: 2     Occupational History   • [employer]     Social History Main Topics   • Smoking status: Current Every Day Smoker     Years: 10.00     Types: Cigarettes   • Smokeless tobacco: Never Used      Comment: 3-4 cigarettes daily   • Alcohol use Yes      Comment: vodka on the weekends only   • Drug use: No   • Sexual activity: Yes     Partners: Male     Birth control/ protection: IUD      Comment:      Other Topics Concern   • Not on file         Objective   Physical Exam   Constitutional: She is oriented to person, place, and time. She appears well-developed and well-nourished.   Pt is awake and alert with normal mentation. She appears mildly anxious.    HENT:   Head: Normocephalic and atraumatic.   Right Ear: External ear normal.   Left Ear: External ear normal.   Nose: Nose normal.   Mouth/Throat: Oropharynx is clear and moist. No oropharyngeal exudate.   Eyes: Conjunctivae and EOM are normal. Pupils are equal, round, and reactive to light. Right eye exhibits no discharge. Left eye exhibits no discharge. No scleral icterus.   Neck: Normal range of motion. Neck supple.   Cardiovascular: Normal rate, regular rhythm and normal heart sounds.  Exam  reveals no gallop and no friction rub.    No murmur heard.  Pulmonary/Chest: Effort normal and breath sounds normal. No respiratory distress. She has no wheezes. She has no rales. She exhibits no tenderness.   Lungs are clear diffusely.    Abdominal: Soft. Bowel sounds are normal. There is no tenderness.   Musculoskeletal: Normal range of motion.   Neurological: She is alert and oriented to person, place, and time. GCS eye subscore is 4. GCS verbal subscore is 5. GCS motor subscore is 6.   Skin: Skin is warm and dry. She is not diaphoretic.   Psychiatric: Her behavior is normal. Judgment and thought content normal. Her mood appears anxious. Her speech is rapid and/or pressured.   Nursing note and vitals reviewed.      Procedures         ED Course     Recent Results (from the past 24 hour(s))   Urinalysis With / Culture If Indicated - Urine, Clean Catch    Collection Time: 05/21/18 10:43 PM   Result Value Ref Range    Color, UA Dark Yellow (A) Yellow, Straw    Appearance, UA Clear Clear    pH, UA 6.0 5.0 - 8.0    Specific Gravity, UA >=1.030 1.001 - 1.030    Glucose, UA Negative Negative    Ketones, UA >=160 mg/dL (4+) (A) Negative    Bilirubin, UA Moderate (2+) (A) Negative    Blood, UA Negative Negative    Protein, UA 30 mg/dL (1+) (A) Negative    Leuk Esterase, UA Trace (A) Negative    Nitrite, UA Negative Negative    Urobilinogen, UA 1.0 E.U./dL 0.2 - 1.0 E.U./dL   Urinalysis, Microscopic Only - Urine, Clean Catch    Collection Time: 05/21/18 10:43 PM   Result Value Ref Range    RBC, UA 3-6 (A) None Seen, 0-2 /HPF    WBC, UA 0-2 None Seen, 0-2 /HPF    Bacteria, UA Trace None Seen, Trace /HPF    Squamous Epithelial Cells, UA 3-6 (A) None Seen, 0-2 /HPF    Hyaline Casts, UA 13-20 0 - 6 /LPF    Methodology Automated Microscopy    POCT Pregnancy, urine    Collection Time: 05/21/18 10:47 PM   Result Value Ref Range    HCG, Urine, QL Negative Negative    Lot Number [id number]     Internal Positive Control Positive      Internal Negative Control Negative    Comprehensive Metabolic Panel    Collection Time: 05/21/18 10:57 PM   Result Value Ref Range    Glucose 96 70 - 100 mg/dL    BUN 10 9 - 23 mg/dL    Creatinine 0.80 0.60 - 1.30 mg/dL    Sodium 135 132 - 146 mmol/L    Potassium 3.4 (L) 3.5 - 5.5 mmol/L    Chloride 99 99 - 109 mmol/L    CO2 18.0 (L) 20.0 - 31.0 mmol/L    Calcium 9.3 8.7 - 10.4 mg/dL    Total Protein 7.9 5.7 - 8.2 g/dL    Albumin 4.40 3.20 - 4.80 g/dL    ALT (SGPT) 99 (H) 7 - 40 U/L    AST (SGOT) 60 (H) 0 - 33 U/L    Alkaline Phosphatase 112 (H) 25 - 100 U/L    Total Bilirubin 0.8 0.3 - 1.2 mg/dL    eGFR Non African Amer 87 >60 mL/min/1.73    Globulin 3.5 gm/dL    A/G Ratio 1.3 (L) 1.5 - 2.5 g/dL    BUN/Creatinine Ratio 12.5 7.0 - 25.0    Anion Gap 18.0 (H) 3.0 - 11.0 mmol/L   CBC Auto Differential    Collection Time: 05/21/18 10:57 PM   Result Value Ref Range    WBC 10.23 3.50 - 10.80 10*3/mm3    RBC 4.68 3.89 - 5.14 10*6/mm3    Hemoglobin 14.7 11.5 - 15.5 g/dL    Hematocrit 42.1 34.5 - 44.0 %    MCV 90.0 80.0 - 99.0 fL    MCH 31.4 (H) 27.0 - 31.0 pg    MCHC 34.9 32.0 - 36.0 g/dL    RDW 13.6 11.3 - 14.5 %    RDW-SD 44.4 37.0 - 54.0 fl    MPV 10.3 6.0 - 12.0 fL    Platelets 223 150 - 450 10*3/mm3    Neutrophil % 87.3 (H) 41.0 - 71.0 %    Lymphocyte % 7.4 (L) 24.0 - 44.0 %    Monocyte % 5.2 0.0 - 12.0 %    Eosinophil % 0.0 0.0 - 3.0 %    Basophil % 0.1 0.0 - 1.0 %    Immature Grans % 0.2 0.0 - 0.6 %    Neutrophils, Absolute 8.93 (H) 1.50 - 8.30 10*3/mm3    Lymphocytes, Absolute 0.76 0.60 - 4.80 10*3/mm3    Monocytes, Absolute 0.53 0.00 - 1.00 10*3/mm3    Eosinophils, Absolute 0.00 0.00 - 0.30 10*3/mm3    Basophils, Absolute 0.01 0.00 - 0.20 10*3/mm3    Immature Grans, Absolute 0.02 0.00 - 0.03 10*3/mm3   POC Troponin, Rapid    Collection Time: 05/21/18 11:01 PM   Result Value Ref Range    Troponin I 0.01 0.00 - 0.07 ng/mL     Note: In addition to lab results from this visit, the labs listed above may include labs  "taken at another facility or during a different encounter within the last 24 hours. Please correlate lab times with ED admission and discharge times for further clarification of the services performed during this visit.    XR Chest 1 View   Final Result     Normal chest x-ray.      THIS DOCUMENT HAS BEEN ELECTRONICALLY SIGNED BY ESTEBAN PHAN MD        Vitals:    05/21/18 1901 05/21/18 2120 05/21/18 2300 05/21/18 2330   BP: 148/94 (!) 193/107 129/77 119/92   BP Location: Left arm Left arm     Patient Position: Sitting Sitting     Pulse: 99 77 58 57   Resp: 28      Temp: 98.1 °F (36.7 °C)      TempSrc: Oral      SpO2: 98% 96% 97% 100%   Weight: 105 kg (232 lb)      Height: 154.9 cm (61\")        Medications - No data to display  ECG/EMG Results (last 24 hours)     Procedure Component Value Units Date/Time    ECG 12 Lead [[id number]] Collected:  05/21/18 2037     Updated:  05/21/18 2038                        MDM  Number of Diagnoses or Management Options  Panic attack: new and requires workup  Diagnosis management comments: For evaluation shows slightly elevated LFTs, the patient has no tenderness to palpation in the right upper quadrant to indicate gallbladder disease.    No other significant laboratory abnormalities.    Previously perform labs were reviewed, the patient had a normal TSH past, therefore the needed.    Patient has calmed down significant since initial presentation, I feel this episode was most consistent with anxiety and an acute panic attack.            Amount and/or Complexity of Data Reviewed  Clinical lab tests: ordered and reviewed  Tests in the radiology section of CPT®: ordered and reviewed  Decide to obtain previous medical records or to obtain history from someone other than the patient: yes  Independent visualization of images, tracings, or specimens: yes    Patient Progress  Patient progress: stable      Final diagnoses:   Panic attack       Documentation assistance provided by merari Cotter " Tonie Lowe.  Information recorded by the scribe was done at my direction and has been verified and validated by me.     Vahe Lowe  05/21/18 2226       Suzanna Tenorio MD  05/22/18 0024

## 2018-05-22 NOTE — ED PROVIDER NOTES
Subjective   This is a 27-year-old  female that presents to the ER with increased anxiety and panic attack.  Patient reports history of anxiety in the past, and she used to take Zoloft, but this has been over 8 years ago.  She moved here from [place].  She has been seeing Dr. Mansfield as her primary care provider, that she says that he has not been treating her anxiety.  She used to have a therapist as well as a counselor in [place].  Patient reports multiple increased stressors.  She is  with 2 children.  Her  has not been working for quite some time.  The school also reported scratches on one of her children, and CPS has been notified and they are watching patient closely.  She says that she is not sleeping well and having difficulty concentrating.  She denies any suicidal ideations.  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  She says that she is having to step away from her desk due to increased anxiety and episodes of crying.  She was driving home this evening and had to pull over due to increased anxiety and panic attack.  She was seen and evaluated earlier this evening in the ER.  Chest x-ray and EKG were normal.  She also had thyroid studies within the past year which were normal.  Patient denies any over-the-counter herbal or energy supplements.  She also denies any increased caffeine intake.        History provided by:  Patient  Anxiety   Presents for follow-up visit. Symptoms include chest pain, decreased concentration, insomnia, nausea, nervous/anxious behavior, palpitations, panic and shortness of breath. Patient reports no suicidal ideas. Symptoms occur most days. The most recent episode lasted 6 hours (Increased anxiety x 2 weeks.). The severity of symptoms is interfering with daily activities. The patient sleeps 5 hours per night. The quality of sleep is non-restorative.           Review of Systems   Constitutional: Positive for appetite change (decreased appetite).    Respiratory: Positive for chest tightness and shortness of breath.    Cardiovascular: Positive for chest pain and palpitations.   Gastrointestinal: Positive for nausea. Negative for abdominal pain.   Genitourinary: Negative.         LMP: Nexplanon   Psychiatric/Behavioral: Positive for decreased concentration. Negative for suicidal ideas. The patient is nervous/anxious and has insomnia.         History of anxiety.  Used to be on Zoloft 8 years ago.  Now, patient on no meds for anxiety.       Past Medical History:   Diagnosis Date   • Anxiety and depression    • Asthma    • Heart murmur    • Morbid obesity    • Tobacco dependence        No Known Allergies    Past Surgical History:   Procedure Laterality Date   •  SECTION  2011   •  SECTION PRIMARY  2008   • INTRAUTERINE DEVICE INSERTION  2012   • OVARIAN CYST REMOVAL Right    • WISDOM TOOTH EXTRACTION  2013       Family History   Problem Relation Age of Onset   • Diabetes Mother    • Rheum arthritis Mother    • Hypertension Mother    • Hyperlipidemia Mother    • Heart disease Father    • Asthma Father    • Breast cancer Paternal Aunt        Social History     Social History   • Marital status:    • Number of children: 2     Occupational History   • [employer]     Social History Main Topics   • Smoking status: Current Every Day Smoker     Years: 10.00     Types: Cigarettes   • Smokeless tobacco: Never Used      Comment: 3-4 cigarettes daily   • Alcohol use Yes      Comment: vodka on the weekends only   • Drug use: No   • Sexual activity: Yes     Partners: Male     Birth control/ protection: IUD      Comment:      Other Topics Concern   • Not on file           Objective   Physical Exam   Constitutional: She is oriented to person, place, and time. She appears well-developed and well-nourished. No distress.   HENT:   Head: Normocephalic and atraumatic.   Eyes: Conjunctivae are normal. No scleral icterus.   Neck:  Normal range of motion. Neck supple.   Cardiovascular: Normal rate, regular rhythm and normal heart sounds.   No extrasystoles are present.   No murmur heard.  Pulmonary/Chest: Effort normal and breath sounds normal. No respiratory distress.   Abdominal: Soft. She exhibits no distension. There is no tenderness.   Musculoskeletal: Normal range of motion. She exhibits no edema.   Lymphadenopathy:     She has no cervical adenopathy.   Neurological: She is alert and oriented to person, place, and time.   Skin: Skin is warm and dry. She is not diaphoretic.   Psychiatric: Her speech is normal and behavior is normal. Her mood appears anxious. Her affect is not angry. She expresses no suicidal ideation.   Patient is anxious and nervous on exam.  Good eye contact.  Tearful at times.   Nursing note and vitals reviewed.      Procedures           ED Course  ED Course as of May 22 0324   Tue May 22, 2018   0320 She feels that she is stable to be discharged to home right now.  I will give her follow-up information for psychiatric services available in [place].  Vital signs are stable.  She is ready for discharge to home.  [FC]      ED Course User Index  [FC] Arlin Ortiz PA-C      Recent Results (from the past 24 hour(s))   Urinalysis With / Culture If Indicated - Urine, Clean Catch    Collection Time: 05/21/18 10:43 PM   Result Value Ref Range    Color, UA Dark Yellow (A) Yellow, Straw    Appearance, UA Clear Clear    pH, UA 6.0 5.0 - 8.0    Specific Gravity, UA >=1.030 1.001 - 1.030    Glucose, UA Negative Negative    Ketones, UA >=160 mg/dL (4+) (A) Negative    Bilirubin, UA Moderate (2+) (A) Negative    Blood, UA Negative Negative    Protein, UA 30 mg/dL (1+) (A) Negative    Leuk Esterase, UA Trace (A) Negative    Nitrite, UA Negative Negative    Urobilinogen, UA 1.0 E.U./dL 0.2 - 1.0 E.U./dL   Urinalysis, Microscopic Only - Urine, Clean Catch    Collection Time: 05/21/18 10:43 PM   Result Value Ref Range    RBC, UA 3-6  (A) None Seen, 0-2 /HPF    WBC, UA 0-2 None Seen, 0-2 /HPF    Bacteria, UA Trace None Seen, Trace /HPF    Squamous Epithelial Cells, UA 3-6 (A) None Seen, 0-2 /HPF    Hyaline Casts, UA 13-20 0 - 6 /LPF    Methodology Automated Microscopy    POCT Pregnancy, urine    Collection Time: 05/21/18 10:47 PM   Result Value Ref Range    HCG, Urine, QL Negative Negative    Lot Number [id number]     Internal Positive Control Positive     Internal Negative Control Negative    Comprehensive Metabolic Panel    Collection Time: 05/21/18 10:57 PM   Result Value Ref Range    Glucose 96 70 - 100 mg/dL    BUN 10 9 - 23 mg/dL    Creatinine 0.80 0.60 - 1.30 mg/dL    Sodium 135 132 - 146 mmol/L    Potassium 3.4 (L) 3.5 - 5.5 mmol/L    Chloride 99 99 - 109 mmol/L    CO2 18.0 (L) 20.0 - 31.0 mmol/L    Calcium 9.3 8.7 - 10.4 mg/dL    Total Protein 7.9 5.7 - 8.2 g/dL    Albumin 4.40 3.20 - 4.80 g/dL    ALT (SGPT) 99 (H) 7 - 40 U/L    AST (SGOT) 60 (H) 0 - 33 U/L    Alkaline Phosphatase 112 (H) 25 - 100 U/L    Total Bilirubin 0.8 0.3 - 1.2 mg/dL    eGFR Non African Amer 87 >60 mL/min/1.73    Globulin 3.5 gm/dL    A/G Ratio 1.3 (L) 1.5 - 2.5 g/dL    BUN/Creatinine Ratio 12.5 7.0 - 25.0    Anion Gap 18.0 (H) 3.0 - 11.0 mmol/L   CBC Auto Differential    Collection Time: 05/21/18 10:57 PM   Result Value Ref Range    WBC 10.23 3.50 - 10.80 10*3/mm3    RBC 4.68 3.89 - 5.14 10*6/mm3    Hemoglobin 14.7 11.5 - 15.5 g/dL    Hematocrit 42.1 34.5 - 44.0 %    MCV 90.0 80.0 - 99.0 fL    MCH 31.4 (H) 27.0 - 31.0 pg    MCHC 34.9 32.0 - 36.0 g/dL    RDW 13.6 11.3 - 14.5 %    RDW-SD 44.4 37.0 - 54.0 fl    MPV 10.3 6.0 - 12.0 fL    Platelets 223 150 - 450 10*3/mm3    Neutrophil % 87.3 (H) 41.0 - 71.0 %    Lymphocyte % 7.4 (L) 24.0 - 44.0 %    Monocyte % 5.2 0.0 - 12.0 %    Eosinophil % 0.0 0.0 - 3.0 %    Basophil % 0.1 0.0 - 1.0 %    Immature Grans % 0.2 0.0 - 0.6 %    Neutrophils, Absolute 8.93 (H) 1.50 - 8.30 10*3/mm3    Lymphocytes, Absolute 0.76 0.60 - 4.80  "10*3/mm3    Monocytes, Absolute 0.53 0.00 - 1.00 10*3/mm3    Eosinophils, Absolute 0.00 0.00 - 0.30 10*3/mm3    Basophils, Absolute 0.01 0.00 - 0.20 10*3/mm3    Immature Grans, Absolute 0.02 0.00 - 0.03 10*3/mm3   POC Troponin, Rapid    Collection Time: 05/21/18 11:01 PM   Result Value Ref Range    Troponin I 0.01 0.00 - 0.07 ng/mL     Note: In addition to lab results from this visit, the labs listed above may include labs taken at another facility or during a different encounter within the last 24 hours. Please correlate lab times with ED admission and discharge times for further clarification of the services performed during this visit.    No orders to display     Vitals:    05/22/18 0054 05/22/18 0227   BP: 179/92 180/94   BP Location: Left arm    Patient Position: Sitting    Pulse: 63    Resp: 20    Temp: 98.8 °F (37.1 °C)    TempSrc: Oral    SpO2: 98% 99%   Weight: 103 kg (226 lb)    Height: 154.9 cm (61\")      Medications   hydrOXYzine (ATARAX) tablet 50 mg (50 mg Oral Given 5/22/18 0231)     ECG/EMG Results (last 24 hours)     ** No results found for the last 24 hours. **                    [facility]      Final diagnoses:   Anxiety   Panic attack   Stress at home   Tobacco dependence            Arlin Ortiz PA-C  05/22/18 0324    "